# Patient Record
Sex: FEMALE | Race: OTHER | NOT HISPANIC OR LATINO
[De-identification: names, ages, dates, MRNs, and addresses within clinical notes are randomized per-mention and may not be internally consistent; named-entity substitution may affect disease eponyms.]

---

## 2023-11-17 PROBLEM — Z00.00 ENCOUNTER FOR PREVENTIVE HEALTH EXAMINATION: Status: ACTIVE | Noted: 2023-11-17

## 2023-11-22 ENCOUNTER — APPOINTMENT (OUTPATIENT)
Dept: BARIATRICS | Facility: CLINIC | Age: 23
End: 2023-11-22
Payer: SELF-PAY

## 2023-11-22 VITALS — HEIGHT: 71 IN | BODY MASS INDEX: 41.02 KG/M2 | WEIGHT: 293 LBS

## 2023-11-22 DIAGNOSIS — Z86.39 PERSONAL HISTORY OF OTHER ENDOCRINE, NUTRITIONAL AND METABOLIC DISEASE: ICD-10-CM

## 2023-11-22 PROCEDURE — 99204 OFFICE O/P NEW MOD 45 MIN: CPT | Mod: 95

## 2023-11-22 RX ORDER — TIRZEPATIDE 10 MG/.5ML
10 INJECTION, SOLUTION SUBCUTANEOUS
Refills: 0 | Status: ACTIVE | COMMUNITY

## 2024-02-06 ENCOUNTER — APPOINTMENT (OUTPATIENT)
Dept: BARIATRICS | Facility: CLINIC | Age: 24
End: 2024-02-06
Payer: COMMERCIAL

## 2024-02-06 VITALS — WEIGHT: 293 LBS | HEIGHT: 71 IN | BODY MASS INDEX: 41.02 KG/M2

## 2024-02-06 PROCEDURE — 97802 MEDICAL NUTRITION INDIV IN: CPT | Mod: NC

## 2024-04-23 ENCOUNTER — APPOINTMENT (OUTPATIENT)
Dept: BARIATRICS | Facility: CLINIC | Age: 24
End: 2024-04-23

## 2024-05-06 ENCOUNTER — NON-APPOINTMENT (OUTPATIENT)
Age: 24
End: 2024-05-06

## 2024-05-06 ENCOUNTER — APPOINTMENT (OUTPATIENT)
Dept: INTERNAL MEDICINE | Facility: CLINIC | Age: 24
End: 2024-05-06
Payer: COMMERCIAL

## 2024-05-06 ENCOUNTER — TRANSCRIPTION ENCOUNTER (OUTPATIENT)
Age: 24
End: 2024-05-06

## 2024-05-06 VITALS
SYSTOLIC BLOOD PRESSURE: 130 MMHG | BODY MASS INDEX: 41.02 KG/M2 | OXYGEN SATURATION: 100 % | DIASTOLIC BLOOD PRESSURE: 80 MMHG | WEIGHT: 293 LBS | HEART RATE: 67 BPM | HEIGHT: 71 IN | TEMPERATURE: 98.2 F

## 2024-05-06 VITALS
DIASTOLIC BLOOD PRESSURE: 71 MMHG | HEART RATE: 59 BPM | HEIGHT: 71 IN | OXYGEN SATURATION: 98 % | SYSTOLIC BLOOD PRESSURE: 109 MMHG | WEIGHT: 293 LBS | TEMPERATURE: 98 F | RESPIRATION RATE: 16 BRPM

## 2024-05-06 DIAGNOSIS — Z83.3 FAMILY HISTORY OF DIABETES MELLITUS: ICD-10-CM

## 2024-05-06 DIAGNOSIS — Z82.49 FAMILY HISTORY OF ISCHEMIC HEART DISEASE AND OTHER DISEASES OF THE CIRCULATORY SYSTEM: ICD-10-CM

## 2024-05-06 DIAGNOSIS — R73.03 PREDIABETES.: ICD-10-CM

## 2024-05-06 DIAGNOSIS — Z78.9 OTHER SPECIFIED HEALTH STATUS: ICD-10-CM

## 2024-05-06 DIAGNOSIS — Z01.818 ENCOUNTER FOR OTHER PREPROCEDURAL EXAMINATION: ICD-10-CM

## 2024-05-06 PROCEDURE — 99214 OFFICE O/P EST MOD 30 MIN: CPT

## 2024-05-06 NOTE — PATIENT PROFILE ADULT - FALL HARM RISK - UNIVERSAL INTERVENTIONS
Bed in lowest position, wheels locked, appropriate side rails in place/Call bell, personal items and telephone in reach/Instruct patient to call for assistance before getting out of bed or chair/Non-slip footwear when patient is out of bed/[place] to call system/Physically safe environment - no spills, clutter or unnecessary equipment/Purposeful Proactive Rounding/Room/bathroom lighting operational, light cord in reach

## 2024-05-06 NOTE — ASSESSMENT
[High Risk Surgery - Intraperitoneal, Intrathoracic or Supringuinal Vascular Procedures] : High Risk Surgery - Intraperitoneal, Intrathoracic or Supringuinal Vascular Procedures - No (0) [Ischemic Heart Disease] : Ischemic Heart Disease - No (0) [Congestive Heart Failure] : Congestive Heart Failure - No (0) [Prior Cerebrovascular Accident or TIA] : Prior Cerebrovascular Accident or TIA - No (0) [Creatinine >= 2mg/dL (1 Point)] : Creatinine >= 2mg/dL - No (0) [Insulin-dependent Diabetic (1 Point)] : Insulin-dependent Diabetic - No (0) [Patient Optimized for Surgery] : Patient optimized for surgery [No Further Testing Recommended] : no further testing recommended [As per surgery] : as per surgery [FreeTextEntry4] : Pt is LOW risk for MODERATE risk procedure. Pt may proceed with elective surgery.

## 2024-05-06 NOTE — END OF VISIT
[FreeTextEntry3] : I, Dr. Ambrose, personally performed the evaluation and management (E/M) services for this established patient who presents today with (a) new problem(s)/exacerbation of (an) existing condition(s).  That E/M includes conducting the examination, assessing all new/exacerbated conditions, and establishing a new plan of care.  Today, my PA, Tala Obando, was here to observe my evaluation and management services for this new problem/exacerbated condition to be followed going forward.

## 2024-05-06 NOTE — HISTORY OF PRESENT ILLNESS
[No Pertinent Pulmonary History] : no history of asthma, COPD, sleep apnea, or smoking [No Adverse Anesthesia Reaction] : no adverse anesthesia reaction in self or family member [(Patient denies any chest pain, claudication, dyspnea on exertion, orthopnea, palpitations or syncope)] : Patient denies any chest pain, claudication, dyspnea on exertion, orthopnea, palpitations or syncope [No Pertinent Cardiac History] : no history of aortic stenosis, atrial fibrillation, coronary artery disease, recent myocardial infarction, or implantable device/pacemaker [Good (7-10 METs)] : Good (7-10 METs) [Chronic Anticoagulation] : no chronic anticoagulation [Chronic Kidney Disease] : no chronic kidney disease [Diabetes] : diabetes [FreeTextEntry1] : single anastomosis duodenal switch [FreeTextEntry2] : 5/7/24 [FreeTextEntry3] : Dr. Elias [FreeTextEntry4] : Pt is a 24 y/o F with PMHx of type 2 diabetes, morbid obesity who presents to the office today for medical clearance for single anastomosis duodenal switch on 5/7/24 with Dr. Janice Brannon 15 mg weekly - stopped 2 weeks ago

## 2024-05-07 ENCOUNTER — TRANSCRIPTION ENCOUNTER (OUTPATIENT)
Age: 24
End: 2024-05-07

## 2024-05-07 ENCOUNTER — APPOINTMENT (OUTPATIENT)
Dept: BARIATRICS | Facility: HOSPITAL | Age: 24
End: 2024-05-07

## 2024-05-07 ENCOUNTER — RESULT REVIEW (OUTPATIENT)
Age: 24
End: 2024-05-07

## 2024-05-07 ENCOUNTER — INPATIENT (INPATIENT)
Facility: HOSPITAL | Age: 24
LOS: 0 days | Discharge: HOME | End: 2024-05-08
Attending: SURGERY | Admitting: SURGERY
Payer: COMMERCIAL

## 2024-05-07 LAB
BLD GP AB SCN SERPL QL: NEGATIVE — SIGNIFICANT CHANGE UP
GLUCOSE BLDC GLUCOMTR-MCNC: 150 MG/DL — HIGH (ref 70–99)
GLUCOSE BLDC GLUCOMTR-MCNC: 95 MG/DL — SIGNIFICANT CHANGE UP (ref 70–99)
HCT VFR BLD CALC: 38.9 % — SIGNIFICANT CHANGE UP (ref 34.5–45)
HGB BLD-MCNC: 13.3 G/DL — SIGNIFICANT CHANGE UP (ref 11.5–15.5)
MCHC RBC-ENTMCNC: 30.6 PG — SIGNIFICANT CHANGE UP (ref 27–34)
MCHC RBC-ENTMCNC: 34.2 GM/DL — SIGNIFICANT CHANGE UP (ref 32–36)
MCV RBC AUTO: 89.4 FL — SIGNIFICANT CHANGE UP (ref 80–100)
NRBC # BLD: 0 /100 WBCS — SIGNIFICANT CHANGE UP (ref 0–0)
PLATELET # BLD AUTO: 319 K/UL — SIGNIFICANT CHANGE UP (ref 150–400)
RBC # BLD: 4.35 M/UL — SIGNIFICANT CHANGE UP (ref 3.8–5.2)
RBC # FLD: 12.7 % — SIGNIFICANT CHANGE UP (ref 10.3–14.5)
RH IG SCN BLD-IMP: POSITIVE — SIGNIFICANT CHANGE UP
WBC # BLD: 15.18 K/UL — HIGH (ref 3.8–10.5)
WBC # FLD AUTO: 15.18 K/UL — HIGH (ref 3.8–10.5)

## 2024-05-07 PROCEDURE — 88307 TISSUE EXAM BY PATHOLOGIST: CPT | Mod: 26

## 2024-05-07 PROCEDURE — 43659 UNLISTED LAPS PX STOMACH: CPT

## 2024-05-07 DEVICE — CLIP APPLIER ETHICON LIGAMAX 5MM: Type: IMPLANTABLE DEVICE | Status: FUNCTIONAL

## 2024-05-07 DEVICE — SURGIFLO HEMOSTATIC MATRIX KIT: Type: IMPLANTABLE DEVICE | Status: FUNCTIONAL

## 2024-05-07 DEVICE — STAPLER ETHICON GST ECHELON 60MM GREEN RELOAD: Type: IMPLANTABLE DEVICE | Status: FUNCTIONAL

## 2024-05-07 DEVICE — STAPLER ETHICON ECHELON ENDOPATH 60MM: Type: IMPLANTABLE DEVICE | Status: FUNCTIONAL

## 2024-05-07 DEVICE — STAPLER ETHICON GST ECHELON 60MM BLUE RELOAD: Type: IMPLANTABLE DEVICE | Status: FUNCTIONAL

## 2024-05-07 RX ORDER — SCOPALAMINE 1 MG/3D
1 PATCH, EXTENDED RELEASE TRANSDERMAL ONCE
Refills: 0 | Status: COMPLETED | OUTPATIENT
Start: 2024-05-07 | End: 2024-05-07

## 2024-05-07 RX ORDER — ACETAMINOPHEN 500 MG
1000 TABLET ORAL ONCE
Refills: 0 | Status: COMPLETED | OUTPATIENT
Start: 2024-05-07 | End: 2024-05-07

## 2024-05-07 RX ORDER — ENOXAPARIN SODIUM 100 MG/ML
30 INJECTION SUBCUTANEOUS ONCE
Refills: 0 | Status: COMPLETED | OUTPATIENT
Start: 2024-05-07 | End: 2024-05-07

## 2024-05-07 RX ORDER — ACETAMINOPHEN 500 MG
1000 TABLET ORAL EVERY 6 HOURS
Refills: 0 | Status: DISCONTINUED | OUTPATIENT
Start: 2024-05-07 | End: 2024-05-08

## 2024-05-07 RX ORDER — KETOROLAC TROMETHAMINE 30 MG/ML
15 SYRINGE (ML) INJECTION EVERY 6 HOURS
Refills: 0 | Status: DISCONTINUED | OUTPATIENT
Start: 2024-05-07 | End: 2024-05-08

## 2024-05-07 RX ORDER — SODIUM CHLORIDE 9 MG/ML
1000 INJECTION, SOLUTION INTRAVENOUS
Refills: 0 | Status: DISCONTINUED | OUTPATIENT
Start: 2024-05-07 | End: 2024-05-08

## 2024-05-07 RX ORDER — THIAMINE MONONITRATE (VIT B1) 100 MG
500 TABLET ORAL DAILY
Refills: 0 | Status: DISCONTINUED | OUTPATIENT
Start: 2024-05-07 | End: 2024-05-08

## 2024-05-07 RX ORDER — HYDROMORPHONE HYDROCHLORIDE 2 MG/ML
0.5 INJECTION INTRAMUSCULAR; INTRAVENOUS; SUBCUTANEOUS
Refills: 0 | Status: DISCONTINUED | OUTPATIENT
Start: 2024-05-07 | End: 2024-05-07

## 2024-05-07 RX ORDER — ONDANSETRON 8 MG/1
4 TABLET, FILM COATED ORAL EVERY 6 HOURS
Refills: 0 | Status: DISCONTINUED | OUTPATIENT
Start: 2024-05-07 | End: 2024-05-08

## 2024-05-07 RX ORDER — PANTOPRAZOLE SODIUM 20 MG/1
40 TABLET, DELAYED RELEASE ORAL DAILY
Refills: 0 | Status: DISCONTINUED | OUTPATIENT
Start: 2024-05-07 | End: 2024-05-08

## 2024-05-07 RX ORDER — ONDANSETRON 8 MG/1
4 TABLET, FILM COATED ORAL ONCE
Refills: 0 | Status: COMPLETED | OUTPATIENT
Start: 2024-05-07 | End: 2024-05-07

## 2024-05-07 RX ADMIN — Medication 400 MILLIGRAM(S): at 13:50

## 2024-05-07 RX ADMIN — Medication 105 MILLIGRAM(S): at 12:05

## 2024-05-07 RX ADMIN — Medication 202 MILLIGRAM(S): at 15:20

## 2024-05-07 RX ADMIN — SCOPALAMINE 1 PATCH: 1 PATCH, EXTENDED RELEASE TRANSDERMAL at 18:37

## 2024-05-07 RX ADMIN — Medication 1000 MILLIGRAM(S): at 19:13

## 2024-05-07 RX ADMIN — SCOPALAMINE 1 PATCH: 1 PATCH, EXTENDED RELEASE TRANSDERMAL at 07:54

## 2024-05-07 RX ADMIN — PANTOPRAZOLE SODIUM 40 MILLIGRAM(S): 20 TABLET, DELAYED RELEASE ORAL at 11:35

## 2024-05-07 RX ADMIN — ONDANSETRON 4 MILLIGRAM(S): 8 TABLET, FILM COATED ORAL at 11:35

## 2024-05-07 RX ADMIN — Medication 15 MILLIGRAM(S): at 23:03

## 2024-05-07 RX ADMIN — ONDANSETRON 4 MILLIGRAM(S): 8 TABLET, FILM COATED ORAL at 12:58

## 2024-05-07 RX ADMIN — ENOXAPARIN SODIUM 30 MILLIGRAM(S): 100 INJECTION SUBCUTANEOUS at 07:54

## 2024-05-07 RX ADMIN — SODIUM CHLORIDE 190 MILLILITER(S): 9 INJECTION, SOLUTION INTRAVENOUS at 13:43

## 2024-05-07 RX ADMIN — Medication 1000 MILLIGRAM(S): at 07:54

## 2024-05-07 NOTE — DISCHARGE NOTE PROVIDER - HOSPITAL COURSE
23-year-old female from [facility] who currently has a BMI of 47. She started losing weight approximately 1 year ago with diet/exercise/Mounjaro and has her maximum weight approximately around 420 lbs giving her a BMI of approximately 58 at that time. Patient presented to [facility] on 5/7 for elective modified duodenal switch. Procedure went uncomplicated. Her postoperative course was unremarkable with toleration of BCLD, passing trial of void, and pain control. On day of discharge patient was stable to be discharged home with outpatient follow up with Dr. Elias.

## 2024-05-07 NOTE — DISCHARGE NOTE PROVIDER - NSDCCPTREATMENT_GEN_ALL_CORE_FT
PRINCIPAL PROCEDURE  Procedure: Sleeve gastrectomy with duodenal switch  Findings and Treatment:

## 2024-05-07 NOTE — DISCHARGE NOTE PROVIDER - CARE PROVIDER_API CALL
Ash [facility]  Sur[facility]  [address], [address]  [place] [address]  Phone: [phone]  Fax: [phone]  Follow Up Time:

## 2024-05-07 NOTE — BRIEF OPERATIVE NOTE - OPERATION/FINDINGS
Stomach divided along Bougie edge using EndoGIA stapler (green x1, blue x5 w/ buttress). Duodenal stump made w/ EndoGIA stapler. ICG confirmation of good perfusion to staple lines. Enterotomies made in distal stomach & ileum using electrocautery. Posterior half of anastomosis sutured laparoscopically. NGT passed through pylorus into small bowel then anterior half of anastomosis completed. Methylene blue leak test negative for extravasation. Hemostasis achieved. Fascia at 15mm post site closed. Port sites closed w/ 4-0 Monocryl sutures & surgical glue.

## 2024-05-07 NOTE — DISCHARGE NOTE PROVIDER - NSDCFUSCHEDAPPT_GEN_ALL_CORE_FT
[facility]  BARIATRIC ESTEBAN [address]  Scheduled Appointment: 05/13/2024

## 2024-05-07 NOTE — H&P ADULT - HISTORY OF PRESENT ILLNESS
23-year-old female from [facility] who currently has a BMI of 47. She started losing weight approximately 1 year ago with diet/exercise/Mounjaro and has her maximum weight approximately around 420 lbs giving her a BMI of approximately 58 at that time. Here for mDS today.

## 2024-05-07 NOTE — DISCHARGE NOTE PROVIDER - NSDCMRMEDTOKEN_GEN_ALL_CORE_FT
Mounjaro 15 mg/0.5 mL subcutaneous solution: 15 milligram(s) subcutaneously every 7 days  ondansetron 8 mg oral tablet: 1 tab(s) orally prn   acetaminophen 160 mg/5 mL oral liquid: 20 milliliter(s) orally every 6 hours as needed for  moderate pain  Eliquis 2.5 mg oral tablet: 1 tab(s) orally 2 times a day Please take twice a day for 21 days starting on Friday 5/10  omeprazole 40 mg oral delayed release capsule: 1 cap(s) orally once a day

## 2024-05-07 NOTE — DISCHARGE NOTE PROVIDER - NSDCCPCAREPLAN_GEN_ALL_CORE_FT
PRINCIPAL DISCHARGE DIAGNOSIS  Diagnosis: Morbid obesity  Assessment and Plan of Treatment:      PRINCIPAL DISCHARGE DIAGNOSIS  Diagnosis: Morbid obesity  Assessment and Plan of Treatment: 23-year-old female from [facility] who currently has a BMI of 47. She started losing weight approximately 1 year ago with diet/exercise/Mounjaro and has her maximum weight approximately around 420 lbs giving her a BMI of approximately 58 at that time. Patient presented to [facility] on 5/7 for elective modified duodenal switch. Procedure went uncomplicated. Her postoperative course was unremarkable with toleration of BCLD, passing trial of void, and pain control. On day of discharge patient was stable to be discharged home with outpatient follow up with Dr. Elias.   1) Please take Tylenol 650 mg every 4 to 6 hours by mouth for moderate pain control. Please do not exceed over 4,000 mg of Tylenol a day.  2) Please start taking Eliquis 2.5 mg by mouth twice a day starting 3 days after surgery. Please start Friday May 10, 2024 .  3) Please take Omeprazole 40 mg once a day by mouth.

## 2024-05-07 NOTE — PROGRESS NOTE ADULT - SUBJECTIVE AND OBJECTIVE BOX
POST-OPERATIVE NOTE    Procedure: Laparoscopic sleeve gastrectomy and hiatal hernia repair    Diagnosis/Indication: morbid obesity     Surgeon: Dr. Elias     S: Pt has no complaints. Denies CP, SOB, CAMPOS, calf tenderness. Pain controlled with medication.    O:  T(C): 36.1 (05-07-24 @ 11:06), Max: 36.1 (05-07-24 @ 11:06)  T(F): 97 (05-07-24 @ 11:06), Max: 97 (05-07-24 @ 11:06)  HR: 91 (05-07-24 @ 12:06) (91 - 103)  BP: 137/79 (05-07-24 @ 12:06) (127/65 - 137/79)  RR: 19 (05-07-24 @ 12:06) (14 - 21)  SpO2: 96% (05-07-24 @ 12:06) (96% - 100%)  Wt(kg): --            Gen: NAD, resting comfortably in bed  C/V: NSR  Pulm: Nonlabored breathing, no respiratory distress  Abd: soft, non distended , TTP around incision site, incision clean dry and intact   Extrem: WWP, no calf edema, SCDs in place       POST-OPERATIVE NOTE    Procedure: Laparoscopic modified duodenal switch     Diagnosis/Indication: morbid obesity     Surgeon: Dr. Elias     S: Pt has no complaints. Denies CP, SOB, CAMPOS, calf tenderness. Pain controlled with medication.    O:  T(C): 36.1 (05-07-24 @ 11:06), Max: 36.1 (05-07-24 @ 11:06)  T(F): 97 (05-07-24 @ 11:06), Max: 97 (05-07-24 @ 11:06)  HR: 91 (05-07-24 @ 12:06) (91 - 103)  BP: 137/79 (05-07-24 @ 12:06) (127/65 - 137/79)  RR: 19 (05-07-24 @ 12:06) (14 - 21)  SpO2: 96% (05-07-24 @ 12:06) (96% - 100%)  Wt(kg): --            Gen: NAD, resting comfortably in bed  C/V: NSR  Pulm: Nonlabored breathing, no respiratory distress  Abd: soft, non distended , TTP around incision site, incision clean dry and intact   Extrem: WWP, no calf edema, SCDs in place

## 2024-05-07 NOTE — DISCHARGE NOTE PROVIDER - NSDCFUADDINST_GEN_ALL_CORE_FT
Please follow up with Dr. Elias in 1-2 weeks. Please call [phone] to schedule your appointment. You may shower; soap and water over incision sites. Do not scrub. Pat dry when done. No tub bathing or swimming until cleared. Keep incision sites out of the sun as scars will darken. No heavy lifting (>10lbs) or strenuous exercise. Diet: Bariatric Clear Fluids. 60 grams protein daily.  64 fluid ounces water daily. Drink small sips throughout the day. Continue diet as outlined by paperwork received as a pre-operative patient. You should be urinating at least 3-4x per day. Call the office if you experience increasing abdominal pain, nausea, vomiting, or temperature >100.4F.      New Medications:  1. Please take omeprazole 40 mg once a day.   2. Please take Tylenol solution 650 mg every 6 hours for mild to moderate pain control.   3. Please start eliquis 2.5 mg twice a day for 21 days on _____.     Warning Signs:  Please call your doctor if you experience the following:  *You experience new chest pain, pressure, squeezing or tightness.  *New or worsening cough, shortness of breath, or wheeze.  *If you are vomiting and cannot keep down fluids or your medications.  *You are getting dehydrated due to continued vomiting, diarrhea, or other reasons. Signs of dehydration include dry mouth, rapid heartbeat, or feeling dizzy or faint when standing.  *You see blood or dark/black material when you vomit or have a bowel movement.  *You experience burning when you urinate, have blood in your urine, or experience a discharge.  *Your pain is not improving within 8-12 hours or is not gone within 24 hours. Call or return immediately if your pain is getting worse, changes location, or moves to your chest or back.  *You have shaking chills, or fever greater than 101.5 degrees Fahrenheit or 38 degrees Celsius.  *Any change in your symptoms, or any new symptoms that concern you.   Please follow up with Dr. Elias in 1-2 weeks. Please call [phone] to schedule your appointment. You may shower; soap and water over incision sites. Do not scrub. Pat dry when done. No tub bathing or swimming until cleared. Keep incision sites out of the sun as scars will darken. No heavy lifting (>10lbs) or strenuous exercise. Diet: Bariatric Clear Fluids. 60 grams protein daily.  64 fluid ounces water daily. Drink small sips throughout the day. Continue diet as outlined by paperwork received as a pre-operative patient. You should be urinating at least 3-4x per day. Call the office if you experience increasing abdominal pain, nausea, vomiting, or temperature >100.4F.      New Medications:    1) Please take Tylenol 650 mg every 4 to 6 hours by mouth for moderate pain control. Please do not exceed over 4,000 mg of Tylenol a day.  2) Please start taking Eliquis 2.5 mg by mouth twice a day starting 3 days after surgery. Please start Friday May 10, 2024 .  3) Please take Omeprazole 40 mg once a day by mouth.      Warning Signs:  Please call your doctor if you experience the following:  *You experience new chest pain, pressure, squeezing or tightness.  *New or worsening cough, shortness of breath, or wheeze.  *If you are vomiting and cannot keep down fluids or your medications.  *You are getting dehydrated due to continued vomiting, diarrhea, or other reasons. Signs of dehydration include dry mouth, rapid heartbeat, or feeling dizzy or faint when standing.  *You see blood or dark/black material when you vomit or have a bowel movement.  *You experience burning when you urinate, have blood in your urine, or experience a discharge.  *Your pain is not improving within 8-12 hours or is not gone within 24 hours. Call or return immediately if your pain is getting worse, changes location, or moves to your chest or back.  *You have shaking chills, or fever greater than 101.5 degrees Fahrenheit or 38 degrees Celsius.  *Any change in your symptoms, or any new symptoms that concern you.   Please follow up with Dr. Elias in 1-2 weeks. Please call [phone] to schedule your appointment. You may shower; soap and water over incision sites. Do not scrub. Pat dry when done. No tub bathing or swimming until cleared. Keep incision sites out of the sun as scars will darken. No heavy lifting (>10lbs) or strenuous exercise. Diet: Bariatric Clear Fluids. 60 grams protein daily.  64 fluid ounces water daily. Drink small sips throughout the day. Continue diet as outlined by paperwork received as a pre-operative patient. You should be urinating at least 3-4x per day. Call the office if you experience increasing abdominal pain, nausea, vomiting, or temperature >100.4F.      New Medications:    1) Please take Tylenol 650 mg every 4 to 6 hours by mouth for moderate pain control. Please do not exceed over 4,000 mg of Tylenol a day.  2) Please start taking Eliquis 2.5 mg by mouth twice a day starting 3 days after surgery. Please start Friday May 10, 2024 .  3) Please take Omeprazole 40 mg once a day by mouth.  4) Please do not resume your mounjaro until you meet with Dr. Elias      Warning Signs:  Please call your doctor if you experience the following:  *You experience new chest pain, pressure, squeezing or tightness.  *New or worsening cough, shortness of breath, or wheeze.  *If you are vomiting and cannot keep down fluids or your medications.  *You are getting dehydrated due to continued vomiting, diarrhea, or other reasons. Signs of dehydration include dry mouth, rapid heartbeat, or feeling dizzy or faint when standing.  *You see blood or dark/black material when you vomit or have a bowel movement.  *You experience burning when you urinate, have blood in your urine, or experience a discharge.  *Your pain is not improving within 8-12 hours or is not gone within 24 hours. Call or return immediately if your pain is getting worse, changes location, or moves to your chest or back.  *You have shaking chills, or fever greater than 101.5 degrees Fahrenheit or 38 degrees Celsius.  *Any change in your symptoms, or any new symptoms that concern you.

## 2024-05-08 ENCOUNTER — TRANSCRIPTION ENCOUNTER (OUTPATIENT)
Age: 24
End: 2024-05-08

## 2024-05-08 VITALS
RESPIRATION RATE: 17 BRPM | OXYGEN SATURATION: 97 % | TEMPERATURE: 99 F | DIASTOLIC BLOOD PRESSURE: 84 MMHG | SYSTOLIC BLOOD PRESSURE: 129 MMHG | HEART RATE: 75 BPM

## 2024-05-08 LAB
ANION GAP SERPL CALC-SCNC: 10 MMOL/L — SIGNIFICANT CHANGE UP (ref 5–17)
BUN SERPL-MCNC: 9 MG/DL — SIGNIFICANT CHANGE UP (ref 7–23)
CALCIUM SERPL-MCNC: 9.1 MG/DL — SIGNIFICANT CHANGE UP (ref 8.4–10.5)
CHLORIDE SERPL-SCNC: 102 MMOL/L — SIGNIFICANT CHANGE UP (ref 96–108)
CO2 SERPL-SCNC: 25 MMOL/L — SIGNIFICANT CHANGE UP (ref 22–31)
CREAT SERPL-MCNC: 0.58 MG/DL — SIGNIFICANT CHANGE UP (ref 0.5–1.3)
EGFR: 132 ML/MIN/1.73M2 — SIGNIFICANT CHANGE UP
GLUCOSE SERPL-MCNC: 102 MG/DL — HIGH (ref 70–99)
HCT VFR BLD CALC: 34.7 % — SIGNIFICANT CHANGE UP (ref 34.5–45)
HGB BLD-MCNC: 12.1 G/DL — SIGNIFICANT CHANGE UP (ref 11.5–15.5)
MAGNESIUM SERPL-MCNC: 2 MG/DL — SIGNIFICANT CHANGE UP (ref 1.6–2.6)
MCHC RBC-ENTMCNC: 30.4 PG — SIGNIFICANT CHANGE UP (ref 27–34)
MCHC RBC-ENTMCNC: 34.9 GM/DL — SIGNIFICANT CHANGE UP (ref 32–36)
MCV RBC AUTO: 87.2 FL — SIGNIFICANT CHANGE UP (ref 80–100)
NRBC # BLD: 0 /100 WBCS — SIGNIFICANT CHANGE UP (ref 0–0)
PHOSPHATE SERPL-MCNC: 4 MG/DL — SIGNIFICANT CHANGE UP (ref 2.5–4.5)
PLATELET # BLD AUTO: 312 K/UL — SIGNIFICANT CHANGE UP (ref 150–400)
POTASSIUM SERPL-MCNC: 3.9 MMOL/L — SIGNIFICANT CHANGE UP (ref 3.5–5.3)
POTASSIUM SERPL-SCNC: 3.9 MMOL/L — SIGNIFICANT CHANGE UP (ref 3.5–5.3)
RBC # BLD: 3.98 M/UL — SIGNIFICANT CHANGE UP (ref 3.8–5.2)
RBC # FLD: 13 % — SIGNIFICANT CHANGE UP (ref 10.3–14.5)
SODIUM SERPL-SCNC: 137 MMOL/L — SIGNIFICANT CHANGE UP (ref 135–145)
WBC # BLD: 13.32 K/UL — HIGH (ref 3.8–10.5)
WBC # FLD AUTO: 13.32 K/UL — HIGH (ref 3.8–10.5)

## 2024-05-08 PROCEDURE — C1889: CPT

## 2024-05-08 PROCEDURE — 88307 TISSUE EXAM BY PATHOLOGIST: CPT

## 2024-05-08 PROCEDURE — 86850 RBC ANTIBODY SCREEN: CPT

## 2024-05-08 PROCEDURE — 85027 COMPLETE CBC AUTOMATED: CPT

## 2024-05-08 PROCEDURE — 36415 COLL VENOUS BLD VENIPUNCTURE: CPT

## 2024-05-08 PROCEDURE — 86901 BLOOD TYPING SEROLOGIC RH(D): CPT

## 2024-05-08 PROCEDURE — 86900 BLOOD TYPING SEROLOGIC ABO: CPT

## 2024-05-08 PROCEDURE — 84100 ASSAY OF PHOSPHORUS: CPT

## 2024-05-08 PROCEDURE — 83735 ASSAY OF MAGNESIUM: CPT

## 2024-05-08 PROCEDURE — 80048 BASIC METABOLIC PNL TOTAL CA: CPT

## 2024-05-08 PROCEDURE — 82962 GLUCOSE BLOOD TEST: CPT

## 2024-05-08 RX ORDER — SODIUM CHLORIDE 9 MG/ML
1000 INJECTION, SOLUTION INTRAVENOUS
Refills: 0 | Status: DISCONTINUED | OUTPATIENT
Start: 2024-05-08 | End: 2024-05-08

## 2024-05-08 RX ORDER — DEXAMETHASONE 0.5 MG/5ML
6 ELIXIR ORAL ONCE
Refills: 0 | Status: DISCONTINUED | OUTPATIENT
Start: 2024-05-08 | End: 2024-05-08

## 2024-05-08 RX ORDER — DEXAMETHASONE 0.5 MG/5ML
6 ELIXIR ORAL ONCE
Refills: 0 | Status: COMPLETED | OUTPATIENT
Start: 2024-05-08 | End: 2024-05-08

## 2024-05-08 RX ORDER — ACETAMINOPHEN 500 MG
20 TABLET ORAL
Qty: 560 | Refills: 0
Start: 2024-05-08 | End: 2024-05-14

## 2024-05-08 RX ORDER — TIRZEPATIDE 15 MG/.5ML
15 INJECTION, SOLUTION SUBCUTANEOUS
Refills: 0 | DISCHARGE

## 2024-05-08 RX ORDER — OMEPRAZOLE 10 MG/1
1 CAPSULE, DELAYED RELEASE ORAL
Qty: 30 | Refills: 0
Start: 2024-05-08 | End: 2024-06-06

## 2024-05-08 RX ORDER — ONDANSETRON 8 MG/1
1 TABLET, FILM COATED ORAL
Refills: 0 | DISCHARGE

## 2024-05-08 RX ADMIN — Medication 15 MILLIGRAM(S): at 11:10

## 2024-05-08 RX ADMIN — PANTOPRAZOLE SODIUM 40 MILLIGRAM(S): 20 TABLET, DELAYED RELEASE ORAL at 11:10

## 2024-05-08 RX ADMIN — Medication 1000 MILLIGRAM(S): at 13:54

## 2024-05-08 RX ADMIN — Medication 1000 MILLIGRAM(S): at 02:18

## 2024-05-08 RX ADMIN — SODIUM CHLORIDE 95 MILLILITER(S): 9 INJECTION, SOLUTION INTRAVENOUS at 11:11

## 2024-05-08 RX ADMIN — Medication 6 MILLIGRAM(S): at 09:29

## 2024-05-08 RX ADMIN — SCOPALAMINE 1 PATCH: 1 PATCH, EXTENDED RELEASE TRANSDERMAL at 08:20

## 2024-05-08 RX ADMIN — Medication 1000 MILLIGRAM(S): at 08:09

## 2024-05-08 RX ADMIN — Medication 105 MILLIGRAM(S): at 11:11

## 2024-05-08 RX ADMIN — Medication 15 MILLIGRAM(S): at 05:04

## 2024-05-08 NOTE — DIETITIAN INITIAL EVALUATION ADULT - EDUCATION DIETARY MODIFICATIONS
RD Discussed volumes of various cup sizes on tray table and encouraged aiming for 4 oz/hr as tolerated. Pt is prepared with protein shakes, with plan to get vitamins after discharge. RD provided indepth edu on diet advancement and specific nutrient needs s/p MDS. Pt appears receptive, verbalized understanding. Written nutrition education handouts provided./(2) meets goals/outcomes/verbalization

## 2024-05-08 NOTE — PROGRESS NOTE ADULT - SUBJECTIVE AND OBJECTIVE BOX
POST-OP DAY: X s/p      SUBJECTIVE: Patient seen and examined bedside by chief resident.      MEDICATIONS  (PRN):  ondansetron Injectable 4 milliGRAM(s) IV Push every 6 hours PRN Nausea and/or Vomiting      I&O's Detail    07 May 2024 07:01  -  08 May 2024 06:44  --------------------------------------------------------  IN:    Lactated Ringers: 1330 mL  Total IN: 1330 mL    OUT:    Voided (mL): 1800 mL  Total OUT: 1800 mL    Total NET: -470 mL          Vital Signs Last 24 Hrs  T(C): 36.8 (08 May 2024 05:35), Max: 36.8 (08 May 2024 00:33)  T(F): 98.2 (08 May 2024 05:35), Max: 98.2 (08 May 2024 00:33)  HR: 58 (08 May 2024 05:35) (58 - 103)  BP: 133/80 (08 May 2024 05:35) (118/82 - 149/88)  BP(mean): 104 (07 May 2024 12:36) (89 - 104)  RR: 18 (08 May 2024 05:35) (11 - 21)  SpO2: 97% (08 May 2024 05:35) (96% - 100%)    Parameters below as of 08 May 2024 05:35  Patient On (Oxygen Delivery Method): room air        General: NAD, resting comfortably in bed  C/V: NSR  Pulm: Nonlabored breathing, no respiratory distress  Abd: soft, NT/ND  Extrem: WWP, no edema, SCDs in place    LABS:                        12.1   13.32 )-----------( 312      ( 08 May 2024 05:30 )             34.7                 RADIOLOGY & ADDITIONAL STUDIES:     POST-OP DAY: #1 s/p mDs     SUBJECTIVE: Patient seen and examined bedside by chief resident resting comfortably in bed. Patient reports nausea and pain has improved since yesterday. Patient is passing flatus and having bowel movements. She reports some spit up with clears and is tolerating ~1-2 oz/hour. Patient is ambulating at baseline and voiding adequately. Denies chest pain, SOB, headache, and dizziness.       MEDICATIONS  (PRN):  ondansetron Injectable 4 milliGRAM(s) IV Push every 6 hours PRN Nausea and/or Vomiting      I&O's Detail    07 May 2024 07:01  -  08 May 2024 06:44  --------------------------------------------------------  IN:    Lactated Ringers: 1330 mL  Total IN: 1330 mL    OUT:    Voided (mL): 1800 mL  Total OUT: 1800 mL    Total NET: -470 mL          Vital Signs Last 24 Hrs  T(C): 36.8 (08 May 2024 05:35), Max: 36.8 (08 May 2024 00:33)  T(F): 98.2 (08 May 2024 05:35), Max: 98.2 (08 May 2024 00:33)  HR: 58 (08 May 2024 05:35) (58 - 103)  BP: 133/80 (08 May 2024 05:35) (118/82 - 149/88)  BP(mean): 104 (07 May 2024 12:36) (89 - 104)  RR: 18 (08 May 2024 05:35) (11 - 21)  SpO2: 97% (08 May 2024 05:35) (96% - 100%)    Parameters below as of 08 May 2024 05:35  Patient On (Oxygen Delivery Method): room air        General: NAD, resting comfortably in bed  Pulm: Nonlabored breathing, no respiratory distress  Abd: soft, non-tender, non-distended. Port sites are clean/dry/intact   Extrem: WWP, no edema, SCDs in place    LABS:                        12.1   13.32 )-----------( 312      ( 08 May 2024 05:30 )             34.7                 RADIOLOGY & ADDITIONAL STUDIES:

## 2024-05-08 NOTE — DIETITIAN INITIAL EVALUATION ADULT - PERTINENT MEDS FT
MEDICATIONS  (STANDING):  acetaminophen   Oral Liquid .. 1000 milliGRAM(s) Oral every 6 hours  ketorolac   Injectable 15 milliGRAM(s) IV Push every 6 hours  lactated ringers. 1000 milliLiter(s) (95 mL/Hr) IV Continuous <Continuous>  pantoprazole  Injectable 40 milliGRAM(s) IV Push daily  thiamine IVPB 500 milliGRAM(s) IV Intermittent daily    MEDICATIONS  (PRN):  ondansetron Injectable 4 milliGRAM(s) IV Push every 6 hours PRN Nausea and/or Vomiting

## 2024-05-08 NOTE — PROGRESS NOTE ADULT - ASSESSMENT
23F no PMHx/PSHX on Mounjaro now s/p mDS.    BCLD/IVF  Pain & nausea prn  OOBA/IS  SCDs/pending Eliquis  Nutrition c/s  AM labs
23F no PMHx/PSHX on Mounjaro now s/p mDS.    BCLD/IVF  Tylenol/Toradol  Zofran  OOBA/IS  SCDs/pending Eliquis  Nutrition c/s

## 2024-05-08 NOTE — DISCHARGE NOTE NURSING/CASE MANAGEMENT/SOCIAL WORK - PATIENT PORTAL LINK FT
You can access the FollowMyHealth Patient Portal offered by [facility] by registering at the following website: [email or web address]. By joining [facility]’s FollowMyHealth portal, you will also be able to view your health information using other applications (apps) compatible with our system.

## 2024-05-08 NOTE — DIETITIAN INITIAL EVALUATION ADULT - OTHER CALCULATIONS
Above energy needs calculated for wt maintenance (20-25kcal/kg); g pro/day (1.5-2.1g/kg), >/=64oz clear fluids.

## 2024-05-08 NOTE — DIETITIAN INITIAL EVALUATION ADULT - PERTINENT LABORATORY DATA
05-08    137  |  102  |  9   ----------------------------<  102<H>  3.9   |  25  |  0.58    Ca    9.1      08 May 2024 05:30  Phos  4.0     05-08  Mg     2.0     05-08

## 2024-05-08 NOTE — DIETITIAN INITIAL EVALUATION ADULT - REASON INDICATOR FOR ASSESSMENT
Nutrition consult warranted for: Education   Information obtained from: electronic medical record and patient  Chart reviewed, events noted.

## 2024-05-08 NOTE — DIETITIAN INITIAL EVALUATION ADULT - OTHER INFO
Pt seen on 9UR at bedside. On assessment, pt resting in bed. Currently on BARICLLIQ diet. Pt had sips of water out of the clear, 30cc cups. Endorses being in pain and feels nauseaus at time. Discussed volumes of various cup sizes on tray table and encouraged aiming for 4 oz/hr as tolerated. Prepared with protein shakes, with plan to get vitamins after discharge. RD provided indepth edu on diet advancement and specific nutrient needs s/p MDS. NKFA. No dietary restrictions at home. Skin: surgical incisions. Alex Score: 19; GI: WDL per flowsheet. Labs reviewed: 5/8 Gluc: 102 (H); will monitor trends. Pt's wt on admission was 317 pounds, pt's IBW is 155 pounds; IBW to be utilized for nutrient calculations. RD observed pt with no overt signs of muscle or fat wasting. Based on ASPEN guidelines, pt does not meet criteria for malnutrition at this time. RD to follow up per protocol.

## 2024-05-08 NOTE — DIETITIAN INITIAL EVALUATION ADULT - ADD RECOMMEND
1. BARICLLIQ diet   2. Recommend advance to phase 1 bariatric full liquid diet when medically feasible   3. Encourage adequate hydration with goal of 4oz/hr and/or 64 oz/day   4. Monitor BMP, BG, POCT, lytes, replete prn

## 2024-05-10 RX ORDER — APIXABAN 2.5 MG/1
1 TABLET, FILM COATED ORAL
Qty: 42 | Refills: 0
Start: 2024-05-10 | End: 2024-05-30

## 2024-05-11 DIAGNOSIS — E66.01 MORBID (SEVERE) OBESITY DUE TO EXCESS CALORIES: ICD-10-CM

## 2024-05-11 DIAGNOSIS — E88.819 INSULIN RESISTANCE, UNSPECIFIED: ICD-10-CM

## 2024-05-11 DIAGNOSIS — E11.9 TYPE 2 DIABETES MELLITUS WITHOUT COMPLICATIONS: ICD-10-CM

## 2024-05-11 DIAGNOSIS — Z79.84 LONG TERM (CURRENT) USE OF ORAL HYPOGLYCEMIC DRUGS: ICD-10-CM

## 2024-05-13 ENCOUNTER — APPOINTMENT (OUTPATIENT)
Dept: BARIATRICS | Facility: CLINIC | Age: 24
End: 2024-05-13
Payer: COMMERCIAL

## 2024-05-13 VITALS
DIASTOLIC BLOOD PRESSURE: 75 MMHG | HEART RATE: 72 BPM | OXYGEN SATURATION: 100 % | TEMPERATURE: 97.3 F | SYSTOLIC BLOOD PRESSURE: 117 MMHG | HEIGHT: 71 IN | BODY MASS INDEX: 41.02 KG/M2 | WEIGHT: 293 LBS

## 2024-05-13 DIAGNOSIS — E66.01 MORBID (SEVERE) OBESITY DUE TO EXCESS CALORIES: ICD-10-CM

## 2024-05-13 DIAGNOSIS — Z98.84 BARIATRIC SURGERY STATUS: ICD-10-CM

## 2024-05-13 LAB — SURGICAL PATHOLOGY STUDY: SIGNIFICANT CHANGE UP

## 2024-05-13 PROCEDURE — 99024 POSTOP FOLLOW-UP VISIT: CPT

## 2024-05-13 NOTE — PHYSICAL EXAM
[Obese, well nourished, in no acute distress] : obese, well nourished, in no acute distress [Normal] : affect appropriate [de-identified] : 5 healing lap sites with dermabond, no signs of infection

## 2024-05-13 NOTE — HISTORY OF PRESENT ILLNESS
[de-identified] : Ms. Devin Sofia presents today for a postoperative visit s/p laparoscopic modified duodenal switch on 5/7/24. Patient reports she is doing well. Denies pain, n/v, fevers, calf pain, dizziness, chest pain, shortness of breath, acid reflux or PO intolerance. Only experiencing occasional gas pains. Tolerating stage II diet well with adequate fluid intake. Working on increasing protein intake daily; estimating ~60g of protein so far. Compliant with all vitamin supplementation and medications. Voiding, passing gas and having bowel movements, mostly diarrhea 1-3x/day. Reports incisions are healing well.

## 2024-05-13 NOTE — ASSESSMENT
SBO and related clinical course  [FreeTextEntry1] : 23 year old female having a stable postoperative course 6 days s/p MDS. Advised she may take gasex as needed for gas pains, encouraged to ambulate frequently as tolerated. If continues, limit dairy and switch to dairy free protein shake as this can cause increased gas/bloating/diarrhea. Discussed daily protein goals to work on meeting. Patient feels comfortable advancing diet as per guidelines. Instructed to refrain from heavy lifting for four weeks postop, otherwise may advance physical activity as tolerated.

## 2024-07-02 PROBLEM — E66.9 OBESITY, UNSPECIFIED: Chronic | Status: ACTIVE | Noted: 2024-05-06

## 2024-07-02 PROBLEM — E11.9 TYPE 2 DIABETES MELLITUS WITHOUT COMPLICATIONS: Chronic | Status: ACTIVE | Noted: 2024-05-06

## (undated) DEVICE — TROCAR ETHICON ENDOPATH XCEL UNIVERSAL SLEEVE 12MM X 100M STABILITY

## (undated) DEVICE — SUT PDO 2-0 1/2 CIRCLE 26MM NDL 20CM

## (undated) DEVICE — TROCAR ETHICON ENDOPATH XCEL UNIVERSAL SLEEVE WITH OPTIVIEW 5MM X 100MM

## (undated) DEVICE — SUT PDO 2-0 1/2 CIRCLE 26MM NDL 15CM

## (undated) DEVICE — GLV 6.5 PROTEXIS (WHITE)

## (undated) DEVICE — STAPLER ECHELON FLEX POWERED PLUS 440MM

## (undated) DEVICE — POSITIONER SAGE MOBILITY TRANSFER PAD

## (undated) DEVICE — LIGASURE MARYLAND 37CM

## (undated) DEVICE — DRAPE 1/2 SHEET 40X57"

## (undated) DEVICE — VENODYNE/SCD SLEEVE CALF BARIATRIC

## (undated) DEVICE — DRAPE 3/4 SHEET 52X76"

## (undated) DEVICE — SUT MONOCRYL 4-0 27" PS-2 UNDYED

## (undated) DEVICE — SUT PDS II 2-0 27" SH

## (undated) DEVICE — MARKING PEN W RULER

## (undated) DEVICE — SUT QUILL POLYPROPYLENE 1 15CM 22MM CLEAR

## (undated) DEVICE — APPLICATOR ENDOSCOPIC FOR SUGIFLO

## (undated) DEVICE — DRSG DERMABOND 0.7ML

## (undated) DEVICE — TROCAR ETHICON ENDOPATH XCEL BLADELESS 15MM X 100MM STABILITY

## (undated) DEVICE — SUT VICRYL 0 54" TIES

## (undated) DEVICE — CATH NG SALEM SUMP 16FR

## (undated) DEVICE — PACK GENERAL LAPAROSCOPY

## (undated) DEVICE — TIP METZENBAUM SCISSOR MONOPOLAR ENDOCUT (ORANGE)

## (undated) DEVICE — DRAPE LEGGINGS XL

## (undated) DEVICE — STAPLER COVIDIEN ENDO GIA XL HANDLE

## (undated) DEVICE — POSITIONER FOAM EGG CRATE ULNAR 2PCS (PINK)

## (undated) DEVICE — Device

## (undated) DEVICE — TROCAR ETHICON ENDOPATH XCEL BLADELESS 5MM X 100MM STABILITY

## (undated) DEVICE — ENDOCATCH II 15MM

## (undated) DEVICE — TUBING STRYKER PNEUMOCLEAR HIGH FLOW

## (undated) DEVICE — TROCAR ETHICON ENDOPATH XCEL BLADELESS 12MM X 100MM STABILITY

## (undated) DEVICE — D HELP - CLEARVIEW CLEARIFY SYSTEM

## (undated) DEVICE — SYR LUER LOK 30CC